# Patient Record
Sex: MALE | Race: WHITE | URBAN - METROPOLITAN AREA
[De-identification: names, ages, dates, MRNs, and addresses within clinical notes are randomized per-mention and may not be internally consistent; named-entity substitution may affect disease eponyms.]

---

## 2017-12-04 ENCOUNTER — OFFICE VISIT (OUTPATIENT)
Dept: OTOLARYNGOLOGY | Facility: CLINIC | Age: 52
End: 2017-12-04

## 2017-12-04 VITALS — HEIGHT: 71 IN | WEIGHT: 192 LBS | BODY MASS INDEX: 26.88 KG/M2

## 2017-12-04 DIAGNOSIS — R09.81 NASAL CONGESTION: Primary | ICD-10-CM

## 2017-12-04 RX ORDER — BUDESONIDE 0.5 MG/2ML
INHALANT ORAL
COMMUNITY
Start: 2017-06-28

## 2017-12-04 ASSESSMENT — PAIN SCALES - GENERAL: PAINLEVEL: NO PAIN (0)

## 2017-12-04 NOTE — MR AVS SNAPSHOT
After Visit Summary   2017    Rubens Alcantar    MRN: [id number]           Patient Information     Date Of Birth          1965        Visit Information        Provider Department      2017 5:30 PM Andria Pleitez MD [facility] Ear Nose and Throat        Today's Diagnoses     Nasal congestion    -  1      Care Instructions    Plan of care:  We will contact you about seeing Dr Bennett  Clinic contact information:  1. To schedule an appointment call [phone], option 1  2. To talk to the Triage RN call [phone], option 3  3. If you need to speak to Tali or get a message to your doctor on a Friday, call the triage RN  4. TaliRN: [phone]  5. Surgery scheduling:      Antonia Hidalgo: [phone]      Lori Keita: [phone]  6. Fax: [phone]  7. [phone]            Follow-ups after your visit        Your next 10 appointments already scheduled     2018  1:00 PM CST   (Arrive by 12:45 PM)   New Patient Visit with MD SHARDA Dooley [facility] Ear Nose and Throat ([facility] [place])    [address] [place] [id number] [phone]              Who to contact     Please call your clinic at [phone] to:    Ask questions about your health    Make or cancel appointments    Discuss your medicines    Learn about your test results    Speak to your doctor   If you have compliments or concerns about an experience at your clinic, or if you wish to file a complaint, please contact [facility] Patient Relations at [phone] or email us at [email or web address]         Additional Information About Your Visit        MyChart Information     [employer] Anyt[employer] | Anywherehart gives you secure access to your electronic health record. If you see a primary care provider, you can also send messages to your care team and make appointments. If you have questions, please call your primary care clinic.  If you do  "not have a primary care provider, please call [phone] and they will assist you.      ScanSocial is an electronic gateway that provides easy, online access to your medical records. With ScanSocial, you can request a clinic appointment, read your test results, renew a prescription or communicate with your care team.     To access your existing account, please contact your [facility] or call [phone] for assistance.        Care EveryWhere ID     This is your Care EveryWhere ID. This could be used by other organizations to access your [place] medical records  [id number]        Your Vitals Were     Height BMI (Body Mass Index)                1.791 m (5' 10.51\") 27.15 kg/m2           Blood Pressure from Last 3 Encounters:   No data found for BP    Weight from Last 3 Encounters:   12/04/17 87.1 kg (192 lb)   10/31/16 85.3 kg (188 lb)              Today, you had the following     No orders found for display       Primary Care Provider Office Phone # Fax #    Shane Ervin [phone]       [facility] [address] [facility] [address]        Equal Access to Services     [facility]: Hadii aad ku hadasho Soomaali, waaxda luqadaha, qaybta kaalmada adeegyada, alejandra solis . So [facility] [phone].    ATENCIÓN: Si habla español, tiene a ramirez disposición servicios gratuitos de asistencia lingüística. Llame al [phone].    We comply with applicable federal civil rights laws and [place] laws. We do not discriminate on the basis of race, color, national origin, age, disability, sex, sexual orientation, or gender identity.            Thank you!     Thank you for choosing [facility] EAR NOSE AND THROAT  for your care. Our goal is always to provide you with excellent care. Hearing back from our patients is one way we can continue to improve our services. Please take a few minutes to complete the written survey that you may receive in the " mail after your visit with us. Thank you!             Your Updated Medication List - Protect others around you: Learn how to safely use, store and throw away your medicines at www.disposemymeds.org.          This list is accurate as of: 12/4/17 11:59 PM.  Always use your most recent med list.                   Brand Name Dispense Instructions for use Diagnosis    acetaminophen 325 MG tablet    TYLENOL     Take 650 mg by mouth        budesonide 0.5 MG/2ML neb solution    PULMICORT     MIX 1 VIAL IN SALINE NASAL RINSE AND USE TO RINSE NOSE AS DIRECTED. USE 1 TO 2 TIMES A DAY        * EPINEPHrine 0.15 MG/0.3ML injection 2-pack    EPIPEN JR          * EPIPEN 2-KEVYN 0.3 MG/0.3ML injection 2-pack   Generic drug:  EPINEPHrine           zolpidem 10 MG tablet    AMBIEN          * Notice:  This list has 2 medication(s) that are the same as other medications prescribed for you. Read the directions carefully, and ask your doctor or other care provider to review them with you.

## 2017-12-04 NOTE — NURSING NOTE
Chief Complaint   Patient presents with     RECHECK     sinus issues again per pt wife     Charisse Pulido Medical Assistant

## 2017-12-04 NOTE — LETTER
12/4/2017       RE: Rubens Alcantar  [address] [place] [address]     Dear Colleague,    Thank you for referring your patient, Rubens Alcantar, to the [facility] EAR NOSE AND THROAT at [facility]. Please see a copy of my visit note below.    HISTORY OF PRESENT ILLNESS:  Rubens Godinez is a patient who I saw over a year ago with a history of sinus surgeries.  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  I recommend that he meet with a facial plastic surgeon for consideration of a functional rhinoplasty.  He went to [place] and was seen by Dr. Zhang.  He underwent rhinoplasty.  He has continued to have difficulty on the right side.  A revision rhinoplasty is planned for February with rib graft.  He is here to discuss whether or not this seems like a reasonable approach.  He is managing his sinus symptoms very well with topical nasal steroids.  He is using them every other day in budesonide irrigation form.  He has had a bit of a change with his sinus secretions recently and is going to cut back a little bit on the budesonide as he seems to be a bit too dry.    With regards to his breathing, he feels like he is constantly sniffling.  On the right side, he had static nasal valve narrowing and he does seem to sniffle and try to pull his lateral nasal wall away from his septum frequently using his facial muscles.        PHYSICAL EXAMINATION:  His exam shows a straight dorsum that is well supported.  His nasal septum is mostly in the midline except that the nasal valve area on the right where there is slight bowing toward the nasal valve itself.  He does have a static narrowing and a bit of nasal valve collapse on that side. Otherwise, his septum looks straight.  His nasal mucosa looks healthy.      ASSESSMENT AND PLAN:  I agree with the need for a revision procedure  to the extent that he may benefit from something slightly more simple such as a LATERA implant I am not sure.  I am going to have him see my colleague, Dr. Anne Bennett.  He is comfortable seeking a second opinion.  He will keep his surgery scheduled in February in the event that is with Dr. Anne Bennett feels is most appropriate.      HB/ms         Again, thank you for allowing me to participate in the care of your patient.      Sincerely,    Andria Pleitez MD

## 2017-12-05 NOTE — PATIENT INSTRUCTIONS
Plan of care:  We will contact you about seeing Dr BustillosBen  [facility] contact information:  1. To schedule an appointment call [phone], option 1  2. To talk to the Triage RN call [phone], option 3  3. If you need to speak to Tali or get a message to your doctor on a Friday, call the triage RN  4. TaliRN: [phone]  5. Surgery scheduling:      Antonia Hidalgo: [phone]      Lori Keita: [phone]  6. Fax: [phone]  7. [phone]

## 2017-12-05 NOTE — PROGRESS NOTES
HISTORY OF PRESENT ILLNESS:  Rubens Godinez is a patient who I saw over a year ago with a history of sinus surgeries.  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  I recommend that he meet with a facial plastic surgeon for consideration of a functional rhinoplasty.  He went to [facility] and was seen by Dr. Zhang.  He underwent rhinoplasty.  He has continued to have difficulty on the right side.  A revision rhinoplasty is planned for February with rib graft.  He is here to discuss whether or not this seems like a reasonable approach.  He is managing his sinus symptoms very well with topical nasal steroids.  He is using them every other day in budesonide irrigation form.  He has had a bit of a change with his sinus secretions recently and is going to cut back a little bit on the budesonide as he seems to be a bit too dry.    With regards to his breathing, he feels like he is constantly sniffling.  On the right side, he had static nasal valve narrowing and he does seem to sniffle and try to pull his lateral nasal wall away from his septum frequently using his facial muscles.        PHYSICAL EXAMINATION:  His exam shows a straight dorsum that is well supported.  His nasal septum is mostly in the midline except that the nasal valve area on the right where there is slight bowing toward the nasal valve itself.  He does have a static narrowing and a bit of nasal valve collapse on that side. Otherwise, his septum looks straight.  His nasal mucosa looks healthy.      ASSESSMENT AND PLAN:  I agree with the need for a revision procedure to the extent that he may benefit from something slightly more simple such as a LATERA implant I am not sure.  I am going to have him see my colleague, Dr. Anne Bennett.  He is comfortable seeking a second opinion.  He will keep his surgery scheduled in February in the event  that is with Dr. Anne Bennett feels is most appropriate.      HB/ms

## 2018-01-03 ENCOUNTER — OFFICE VISIT (OUTPATIENT)
Dept: OTOLARYNGOLOGY | Facility: CLINIC | Age: 53
End: 2018-01-03
Payer: COMMERCIAL

## 2018-01-03 VITALS — BODY MASS INDEX: 28.29 KG/M2 | HEIGHT: 69 IN | WEIGHT: 191 LBS

## 2018-01-03 DIAGNOSIS — J34.2 DEVIATED NASAL SEPTUM: ICD-10-CM

## 2018-01-03 DIAGNOSIS — J34.89 OBSTRUCTION OF NASAL VALVE: Primary | ICD-10-CM

## 2018-01-03 ASSESSMENT — PAIN SCALES - GENERAL: PAINLEVEL: MILD PAIN (3)

## 2018-01-03 NOTE — NURSING NOTE
Chief Complaint   Patient presents with     Consult     Referral from Doctor Pleitez 2 nd opinion septoplasty     Hans Marquez LPN

## 2018-01-03 NOTE — PROGRESS NOTES
Facial Plastic and Reconstructive Surgery Consultation    Referring Provider:   Andria Pleitez MD  [address]  [place] [address]    HPI:   I had the pleasure of seeing Rubens Alcantar today in clinic for consultation for  Nasal obstruction.   Rubens Alcantar is a 52 year old male with a history of open septorhinoplasty with right ear cartilage graft by Dr. Gordon Zhang at [facility] in March of 2017. He had residual nasal obstruction on the right for which Dr. Zhang has recommended a revision surgery with rib grafting in February 2018.    He was evaluated by Dr. Pleitez who wanted to see if a Latera implant would be of potential benefit. She noted right nasal wall collapse on exam.        Review Of Systems  ROS: 10 point ROS neg other than the symptoms noted above in the HPI.    There is no problem list on file for this patient.    Past Surgical History:   Procedure Laterality Date     HEAD & NECK SURGERY  1991, 1997, 2004    Eye surgery 1991. Diviated septum surgery 1997. Cysts remove     NASAL/SINUS POLYPECTOMY  2015, 2016    No polyps found in 2016.     TONSILLECTOMY  2007     Current Outpatient Prescriptions   Medication Sig Dispense Refill     budesonide (PULMICORT) 0.5 MG/2ML neb solution MIX 1 VIAL IN SALINE NASAL RINSE AND USE TO RINSE NOSE AS DIRECTED. USE 1 TO 2 TIMES A DAY       acetaminophen (TYLENOL) 325 MG tablet Take 650 mg by mouth       EPINEPHrine (EPIPEN 2-KEVYN) 0.3 MG/0.3ML injection 2-pack        zolpidem (AMBIEN) 10 MG tablet        Bee venom; Bees; Erythromycin; Seasonal allergies; and Red dye  Social History     Social History     Marital status:      Spouse name: N/A     Number of children: N/A     Years of education: N/A     Occupational History     Not on file.     Social History Main Topics     Smoking status: Former Smoker     Packs/day: 1.00     Years: 23.00     Types: Cigarettes     Start date: 5/1/1982     Quit date: 11/13/2006     Smokeless tobacco: Not on file       Comment: Already Quit     Alcohol use Yes     Drug use: No     Sexual activity: Yes     Partners: Female     Birth control/ protection: Condom     Other Topics Concern     Not on file     Social History Narrative     Family History   Problem Relation Age of Onset     CANCER Brother      DIABETES Brother      HEART DISEASE Maternal Grandfather      Hypertension Maternal Grandfather      Hypertension Paternal Grandfather      CEREBROVASCULAR DISEASE Paternal Grandfather      Hypertension Mother      Hypertension Father      Depression Father      Substance Abuse Father      Hypertension Sister      Hypertension Brother      Asthma Other      Maternal Uncle       PE:  Alert and Oriented, Answering Questions Appropriately  Right brow laceration status post repair, normocephalic, Face Symmetric  Skin: Luz 2  Facial Nerve Intact and facial movement symmetric  EOM's, PEERL  Nasal Exam: No external Deformity, septum with slight deviation to the right with nasal valve collapse, some improvement with mofied mickie maneuver, left side with intact nasal breathing.  No mucopurulence or polyps, no masses  Chin: Normal   Lips/Teeth/Toungue/Gums: Lips intact, Normal Dentition, Occlusion intact, Oral mucosa intact, no lesions/ulcerations/masses, Tongue mobile  OP: Palate elevates with speech, Mallampati 2, Uvula midline  Neck: No lymphadenopathy, no thyromegaly, trachea midline  Chest: No wheezing, cyanosis, or stridor  Card: Normal upper extremity pulses and capillary refill, not diaphoretic  Neuro/Psych: CN's 2-12 intact, Moves all extremities, ambulation in intact, positive affect, no notable muscle weakness          IMPRESSION:    Septal deviation with right nasal valve collapse      PLAN:    Rubens Alcantar presents today with notable anterior septal deviation and lateral nasal wall collapse leading to internal nasal valve stenosis.  He has had prior septorhinoplasty with ear grafting and is scheduled for revision  surgery with rib graft in February with Dr. Zhang.    Today I assessed him to see if with lateral nasal wall implants would be a good option for him.  Based on my examination he only had a mild improvement in nasal breathing with a modified [place] maneuver which demonstrates that the septum is a significant component of his obstruction.  Lateral nasal wall implants are optimally patients were the collapse of the nasal wall is the primary or even singular component.    I discussed this with the patient and his wife today.  I do believe that Dr. Zhang has an appropriate plan for him and I have encouraged him to proceed with the course of treatment.    He also had a history of a hockey injury with laceration through the right brow and lid he had this glued and sutured I remove some of the glue on the area today.  We discussed optimal scar treatment.          Photodocumentation was obtained.     I spent a total of 45 minutes face-to-face with Rubens Alcantar during today's office visit.  Over 50% of this time was spent counseling the patient and/or coordinating care regarding his nasal obstruction and treatment.  See note for details.

## 2018-01-03 NOTE — MR AVS SNAPSHOT
"              After Visit Summary   1/3/2018    Rubens Alcantar    MRN: [id number]           Patient Information     Date Of Birth          1965        Visit Information        Provider Department      1/3/2018 1:00 PM Mitzy Bennett MD [facility] Ear Nose and Throat        Today's Diagnoses     Obstruction of nasal valve    -  1    Deviated nasal septum           Follow-ups after your visit        Who to contact     Please call your clinic at [phone] to:    Ask questions about your health    Make or cancel appointments    Discuss your medicines    Learn about your test results    Speak to your doctor   If you have compliments or concerns about an experience at your clinic, or if you wish to file a complaint, please contact [facility] Patient Relations at [phone] or email us at [email or web address]         Additional Information About Your Visit        MyChart Information     [employer] gives you secure access to your electronic health record. If you see a primary care provider, you can also send messages to your care team and make appointments. If you have questions, please call your primary care clinic.  If you do not have a primary care provider, please call [phone] and they will assist you.      Private Practice is an electronic gateway that provides easy, online access to your medical records. With Private Practice, you can request a clinic appointment, read your test results, renew a prescription or communicate with your care team.     To access your existing account, please contact your [facility] or call [phone] for assistance.        Care EveryWhere ID     This is your Care EveryWhere ID. This could be used by other organizations to access your [place] medical records  [id number]        Your Vitals Were     Height BMI (Body Mass Index)                1.75 m (5' 8.9\") 28.29 kg/m2           Blood Pressure from Last 3 Encounters: "   No data found for BP    Weight from Last 3 Encounters:   01/03/18 86.6 kg (191 lb)   12/04/17 87.1 kg (192 lb)   10/31/16 85.3 kg (188 lb)              Today, you had the following     No orders found for display       Primary Care Provider Office Phone # Fax #    Shane Ervin [phone]       [place] [facility] [address]  [facility] [address]        Equal Access to Services     YULY NORMAN : Hadii aad ku hadasho Soomaali, waaxda luqadaha, qaybta kaalmada adeegyada, waxay idiin hayaan adeeg kharajosef la'roselyn . So [facility] [phone].    ATENCIÓN: Si habla español, tiene a ramirez disposición servicios gratuitos de asistencia lingüística. [facility] [phone].    We comply with applicable federal civil rights laws and [place] laws. We do not discriminate on the basis of race, color, national origin, age, disability, sex, sexual orientation, or gender identity.            Thank you!     Thank you for choosing [facility] EAR NOSE AND THROAT  for your care. Our goal is always to provide you with excellent care. Hearing back from our patients is one way we can continue to improve our services. Please take a few minutes to complete the written survey that you may receive in the mail after your visit with us. Thank you!             Your Updated Medication List - Protect others around you: Learn how to safely use, store and throw away your medicines at www.disposemymeds.org.          This list is accurate as of: 1/3/18  5:28 PM.  Always use your most recent med list.                   Brand Name Dispense Instructions for use Diagnosis    acetaminophen 325 MG tablet    TYLENOL     Take 650 mg by mouth        budesonide 0.5 MG/2ML neb solution    PULMICORT     MIX 1 VIAL IN SALINE NASAL RINSE AND USE TO RINSE NOSE AS DIRECTED. USE 1 TO 2 TIMES A DAY        EPIPEN 2-KEVYN 0.3 MG/0.3ML injection 2-pack   Generic drug:  EPINEPHrine           zolpidem 10 MG tablet    AMBIEN

## 2018-01-03 NOTE — LETTER
1/3/2018       RE: Rubens Alcantar  [address] [place] [address]     Dear Colleague,    Thank you for referring your patient, Rubens Alcantar, to the [facility] EAR NOSE AND THROAT at [facility]. Please see a copy of my visit note below.    Facial Plastic and Reconstructive Surgery Consultation    Referring Provider:   Andria Pleitez MD  [address] [facility] [address]  [place] [address]    HPI:   I had the pleasure of seeing Rubens Alcantar today in clinic for consultation for  Nasal obstruction.   Rubens Alcantar is a 52 year old male with a history of open septorhinoplasty with right ear cartilage graft by Dr. Gordon Zhang at [place] in March of 2017. He had residual nasal obstruction on the right for which Dr. Zhang has recommended a revision surgery with rib grafting in February 2018.    He was evaluated by Dr. Pleitez who wanted to see if a Latera implant would be of potential benefit. She noted right nasal wall collapse on exam.        Review Of Systems  ROS: 10 point ROS neg other than the symptoms noted above in the HPI.    There is no problem list on file for this patient.    Past Surgical History:   Procedure Laterality Date     HEAD & NECK SURGERY  1991, 1997, 2004    Eye surgery 1991. Diviated septum surgery 1997. Cysts remove     NASAL/SINUS POLYPECTOMY  2015, 2016    No polyps found in 2016.     TONSILLECTOMY  2007     Current Outpatient Prescriptions   Medication Sig Dispense Refill     budesonide (PULMICORT) 0.5 MG/2ML neb solution MIX 1 VIAL IN SALINE NASAL RINSE AND USE TO RINSE NOSE AS DIRECTED. USE 1 TO 2 TIMES A DAY       acetaminophen (TYLENOL) 325 MG tablet Take 650 mg by mouth       EPINEPHrine (EPIPEN 2-KEVYN) 0.3 MG/0.3ML injection 2-pack        zolpidem (AMBIEN) 10 MG tablet        Bee venom; Bees; Erythromycin; Seasonal allergies; and Red dye  Social History     Social History     Marital status:      Spouse name: N/A     Number of children: N/A      Years of education: N/A     Occupational History     Not on file.     Social History Main Topics     Smoking status: Former Smoker     Packs/day: 1.00     Years: 23.00     Types: Cigarettes     Start date: 5/1/1982     Quit date: 11/13/2006     Smokeless tobacco: Not on file      Comment: Already Quit     Alcohol use Yes     Drug use: No     Sexual activity: Yes     Partners: Female     Birth control/ protection: Condom     Other Topics Concern     Not on file     Social History Narrative     Family History   Problem Relation Age of Onset     CANCER Brother      DIABETES Brother      HEART DISEASE Maternal Grandfather      Hypertension Maternal Grandfather      Hypertension Paternal Grandfather      CEREBROVASCULAR DISEASE Paternal Grandfather      Hypertension Mother      Hypertension Father      Depression Father      Substance Abuse Father      Hypertension Sister      Hypertension Brother      Asthma Other      Maternal Uncle       PE:  Alert and Oriented, Answering Questions Appropriately  Right brow laceration status post repair, normocephalic, Face Symmetric  Skin: Luz 2  Facial Nerve Intact and facial movement symmetric  EOM's, PEERL  Nasal Exam: No external Deformity, septum with slight deviation to the right with nasal valve collapse, some improvement with mofied mickie maneuver, left side with intact nasal breathing.  No mucopurulence or polyps, no masses  Chin: Normal   Lips/Teeth/Toungue/Gums: Lips intact, Normal Dentition, Occlusion intact, Oral mucosa intact, no lesions/ulcerations/masses, Tongue mobile  OP: Palate elevates with speech, Mallampati 2, Uvula midline  Neck: No lymphadenopathy, no thyromegaly, trachea midline  Chest: No wheezing, cyanosis, or stridor  Card: Normal upper extremity pulses and capillary refill, not diaphoretic  Neuro/Psych: CN's 2-12 intact, Moves all extremities, ambulation in intact, positive affect, no notable muscle weakness          IMPRESSION:    Septal  deviation with right nasal valve collapse      PLAN:    Rubens Alcantar presents today with notable anterior septal deviation and lateral nasal wall collapse leading to internal nasal valve stenosis.  He has had prior septorhinoplasty with ear grafting and is scheduled for revision surgery with rib graft in February with Dr. Zhang.    Today I assessed him to see if with lateral nasal wall implants would be a good option for him.  Based on my examination he only had a mild improvement in nasal breathing with a modified Catawba maneuver which demonstrates that the septum is a significant component of his obstruction.  Lateral nasal wall implants are optimally patients were the collapse of the nasal wall is the primary or even singular component.    I discussed this with the patient and his wife today.  I do believe that Dr. Zhang has an appropriate plan for him and I have encouraged him to proceed with the course of treatment.    He also had a history of a hockey injury with laceration through the right brow and lid he had this glued and sutured I remove some of the glue on the area today.  We discussed optimal scar treatment.          Photodocumentation was obtained.     I spent a total of 45 minutes face-to-face with Rubens Alcantar during today's office visit.  Over 50% of this time was spent counseling the patient and/or coordinating care regarding his nasal obstruction and treatment.  See note for details.        Again, thank you for allowing me to participate in the care of your patient.      Sincerely,    Mitzy Bennett MD

## 2018-02-13 ENCOUNTER — TRANSFERRED RECORDS (OUTPATIENT)
Dept: HEALTH INFORMATION MANAGEMENT | Facility: CLINIC | Age: 53
End: 2018-02-13

## 2018-02-14 ENCOUNTER — TRANSFERRED RECORDS (OUTPATIENT)
Dept: HEALTH INFORMATION MANAGEMENT | Facility: CLINIC | Age: 53
End: 2018-02-14

## 2018-02-15 ENCOUNTER — TRANSFERRED RECORDS (OUTPATIENT)
Dept: HEALTH INFORMATION MANAGEMENT | Facility: CLINIC | Age: 53
End: 2018-02-15

## 2018-05-03 ENCOUNTER — CARE COORDINATION (OUTPATIENT)
Dept: OTOLARYNGOLOGY | Facility: CLINIC | Age: 53
End: 2018-05-03

## 2018-05-03 NOTE — PROGRESS NOTES
Spouse left message on care coordinators voicemail asking for call back regarding:   Patient had seen both Dr Pleitez and Dr Bennett for nasal obstruction, then had revision surgery with rib graft by Dr Zhang at [facility] in Feb 2018, which is not helping, he is actually worse. Wondering if he should come back and see Dr Pleitez and/or Dr Bennett.  Message left on spouse's voicemail: patient should follow up with Dr Zhang. If he does want to eventually follow with us, he should wait a good six months.

## 2018-05-14 NOTE — PROGRESS NOTES
[facility] Call Center    Phone Message    May a detailed message be left on voicemail: yes    Reason for Call: Other: Pt's wife Lisbet calling in, requesting for pt to see both Maik and Sabrina. She states she had gotten in touch with Tali earlier this month, who recommended seeing Dr. Zhang at the [facility]. Per Lisbet, they did see Vicente again and were told there was nothing further he could do. Per Lisbet, Dr. Zhang stated he should have been feeling better by this time, and to f/u with the surgeons. Lisbet states pt is continuing to have issues with breathing regularly, and is worried the surgery for the nasal blockage did not work. Pt is currently scheduled to see Maik/Sabrina, per Lisbet's request, on 6/20; they are overlapping appointments. Lisbet requests to speak to a nurse about symptoms and a possible sooner appt.    Action Taken: Message routed to:  [facility] ([facility]): [facility]

## 2018-05-21 ENCOUNTER — CARE COORDINATION (OUTPATIENT)
Dept: OTOLARYNGOLOGY | Facility: CLINIC | Age: 53
End: 2018-05-21

## 2018-05-21 NOTE — PROGRESS NOTES
Follow up from spouse's call:  Message left on voicemail: As discussed in previous message, both Dr Pleitez and Dr Bennett have suggested patient follow up at [facility], and that patient wait 6 months to be seen, but since patient has decided to schedule an appt earlier regardless of that recommendation, we will see him at scheduled appt.    Patient did follow up with [facility] (? When) and stated that Dr Zhang felt patient should follow up with ENT[facility].

## 2018-06-12 ENCOUNTER — TELEPHONE (OUTPATIENT)
Dept: OTOLARYNGOLOGY | Facility: CLINIC | Age: 53
End: 2018-06-12

## 2018-06-13 ENCOUNTER — TELEPHONE (OUTPATIENT)
Dept: OTOLARYNGOLOGY | Facility: CLINIC | Age: 53
End: 2018-06-13

## 2018-06-13 NOTE — TELEPHONE ENCOUNTER
[facility] Call Center    Phone Message    May a detailed message be left on voicemail: yes    Reason for Call: Other: Per pt's spouse, returning missed call regarding appt with Dr. Bennett - canceled due to provider having an emergency. Per Lisbet, those appts were set up back to back, was wondering if there is anything that can be accommodated? First available with Dr. Bennett is in August.    Action Taken: Message routed to:  [facility] ([facility]): ENT

## 2018-06-14 NOTE — TELEPHONE ENCOUNTER
Patient upset about the cancellation with Dr. Tressa Contreras on 6-20-18.  Upon reviewing the notes, patient had surgery with an outside provider at [facility] in February 2018.      Patient needs to wait a MINIMUM of 6 months before Dr. Tressa Contrreas would see this patient.  August would be the earliest time and no sooner.  Patient may call back to schedule an appt with Dr. Bennett.  We will not work him into her schedule any sooner.      Patient is to follow up with his surgeon that performed his surgery for his nasal issues.    I left a vmcl for the patient with above stated information.    Lamar Acosta RN  6/14/2018 9:02 AM

## 2018-07-18 ENCOUNTER — OFFICE VISIT (OUTPATIENT)
Dept: OTOLARYNGOLOGY | Facility: CLINIC | Age: 53
End: 2018-07-18
Payer: COMMERCIAL

## 2018-07-18 VITALS — WEIGHT: 190 LBS | HEIGHT: 70 IN | BODY MASS INDEX: 27.2 KG/M2

## 2018-07-18 DIAGNOSIS — J34.89 OBSTRUCTION OF NASAL VALVE: Primary | ICD-10-CM

## 2018-07-18 RX ORDER — AMLODIPINE BESYLATE 2.5 MG/1
2.5 TABLET ORAL DAILY
Refills: 1 | COMMUNITY
Start: 2018-06-10

## 2018-07-18 ASSESSMENT — PAIN SCALES - GENERAL: PAINLEVEL: MILD PAIN (2)

## 2018-07-18 NOTE — LETTER
7/18/2018       RE: Rubens Alcatnar  [address] [place] [address]     Dear Colleague,    Thank you for referring your patient, Rubens Alcantar, to the [facility] EAR NOSE AND THROAT at [facility]. Please see a copy of my visit note below.    Rubens has had difficulty with nasal breathing and sinus issues for many years.  He has undergone 5 procedures for this.  His last 2 procedures were rhinoplasty at [facility] followed by revision with rib graft in February of this year.  He continues to have R > L obstruction.  He is here to discuss options.  He has tried nasal steroid with no benefit.  Some pain on left anterior maxilla.  No obvious sinus issues.      Patient Supplied Answers to Review of Systems   ENT ROS 7/18/2018   Constitutional Problems with sleep   Neurology Dizzy spells   Psychology -   Eyes -   Ears, Nose, Throat Ear pain, Ringing/noise in ears   Cardiopulmonary -   Gastrointestinal/Genitourinary Heartburn/indigestion   Musculoskeletal Sore or stiff joints   Allergy/Immunology -     Exam:  R nasal valve narrow, left nicely open.  No abnormal secretions.      A/P:  Rubens and his wife feel that [facility] has nothing more to offer.  I reviewed his symptoms and exam, and do not feel there is a sinus component - offered CT for evaluation but they feel comfortable holding off on this.  They plan to meet with Dr. Tressa Contreras in the next month or so to discuss whether there are any recommended surgical options.    Again, thank you for allowing me to participate in the care of your patient.      Sincerely,    Andria Pleitez MD

## 2018-07-18 NOTE — MR AVS SNAPSHOT
"              After Visit Summary   7/18/2018    Rubens Alcantar    MRN: [id number]           Patient Information     Date Of Birth          1965        Visit Information        Provider Department      7/18/2018 3:45 PM Andria Pleitez MD [facility] Ear Nose and Throat        Today's Diagnoses     Obstruction of nasal valve    -  1       Follow-ups after your visit        Your next 10 appointments already scheduled     Aug 08, 2018  4:40 PM CDT   (Arrive by 4:25 PM)   Return Visit with MD SHARDA Dooley [facility] Ear Nose and Throat [facility])    [address] [place] [id number] [phone]              Who to contact     Please call your clinic at [phone] to:    Ask questions about your health    Make or cancel appointments    Discuss your medicines    Learn about your test results    Speak to your doctor            Additional Information About Your Visit        MyChart Information     [employer] gives you secure access to your electronic health record. If you see a primary care provider, you can also send messages to your care team and make appointments. If you have questions, please call your primary care clinic.  If you do not have a primary care provider, please call [phone] and they will assist you.      [employer] is an electronic gateway that provides easy, online access to your medical records. With [employer], you can request a clinic appointment, read your test results, renew a prescription or communicate with your care team.     To access your existing account, please contact your [facility] or call [phone] for assistance.        Care EveryWhere ID     This is your Care EveryWhere ID. This could be used by other organizations to access your [facility] medical records  [id number]        Your Vitals Were     Height BMI (Body Mass Index)                1.77 m (5' 9.69\") 27.51 kg/m2           Blood Pressure " from Last 3 Encounters:   No data found for BP    Weight from Last 3 Encounters:   07/18/18 86.2 kg (190 lb)   01/03/18 86.6 kg (191 lb)   12/04/17 87.1 kg (192 lb)              Today, you had the following     No orders found for display       Primary Care Provider Office Phone # Fax #    Shane Ervin [phone]       [place] [facility] [address]  [facility] [address]        Equal Access to Services     DESIREE NORMAN : Hadii aad ku hadasho Soomaali, waaxda luqadaha, qaybta kaalmada adeegyada, waxay idiin hayaan adeeg ligia laasmita . So [facility] [phone].    ATENCIÓN: Si habla español, tiene a ramirez disposición servicios gratuitos de asistencia lingüística. [facility] [phone].    We comply with applicable federal civil rights laws and [place] laws. We do not discriminate on the basis of race, color, national origin, age, disability, sex, sexual orientation, or gender identity.            Thank you!     Thank you for choosing [facility] EAR NOSE AND THROAT  for your care. Our goal is always to provide you with excellent care. Hearing back from our patients is one way we can continue to improve our services. Please take a few minutes to complete the written survey that you may receive in the mail after your visit with us. Thank you!             Your Updated Medication List - Protect others around you: Learn how to safely use, store and throw away your medicines at www.disposemymeds.org.          This list is accurate as of 7/18/18 11:59 PM.  Always use your most recent med list.                   Brand Name Dispense Instructions for use Diagnosis    acetaminophen 325 MG tablet    TYLENOL     Take 650 mg by mouth        amLODIPine 2.5 MG tablet    NORVASC     Take 2.5 mg by mouth daily        budesonide 0.5 MG/2ML neb solution    PULMICORT     MIX 1 VIAL IN SALINE NASAL RINSE AND USE TO RINSE NOSE AS DIRECTED. USE 1 TO 2 TIMES A DAY        EPIPEN 2-KEVYN 0.3 MG/0.3ML injection 2-pack   Generic  drug:  EPINEPHrine           zolpidem 10 MG tablet    AMBIEN

## 2018-07-18 NOTE — NURSING NOTE
"Chief Complaint   Patient presents with     RECHECK     nasal obstruction      Height 1.77 m (5' 9.69\"), weight 86.2 kg (190 lb).    .  Hans Marquez LPN    "

## 2018-07-19 NOTE — PROGRESS NOTES
Rubens has had difficulty with nasal breathing and sinus issues for many years.  He has undergone 5 procedures for this.  His last 2 procedures were rhinoplasty at [facility] followed by revision with rib graft in February of this year.  He continues to have R > L obstruction.  He is here to discuss options.  He has tried nasal steroid with no benefit.  Some pain on left anterior maxilla.  No obvious sinus issues.      Patient Supplied Answers to Review of Systems   ENT ROS 7/18/2018   Constitutional Problems with sleep   Neurology Dizzy spells   Psychology -   Eyes -   Ears, Nose, Throat Ear pain, Ringing/noise in ears   Cardiopulmonary -   Gastrointestinal/Genitourinary Heartburn/indigestion   Musculoskeletal Sore or stiff joints   Allergy/Immunology -     Exam:  R nasal valve narrow, left nicely open.  No abnormal secretions.      A/P:  Rubens and his wife feel that [facility] has nothing more to offer.  I reviewed his symptoms and exam, and do not feel there is a sinus component - offered CT for evaluation but they feel comfortable holding off on this.  They plan to meet with Dr. Tressa Contreras in the next month or so to discuss whether there are any recommended surgical options.

## 2018-08-08 ENCOUNTER — OFFICE VISIT (OUTPATIENT)
Dept: OTOLARYNGOLOGY | Facility: CLINIC | Age: 53
End: 2018-08-08
Payer: COMMERCIAL

## 2018-08-08 DIAGNOSIS — R09.81 NASAL CONGESTION: Primary | ICD-10-CM

## 2018-08-08 ASSESSMENT — PAIN SCALES - GENERAL: PAINLEVEL: MILD PAIN (3)

## 2018-08-08 NOTE — NURSING NOTE
Chief Complaint   Patient presents with     RECHECK     nasal obstruction     There were no vitals taken for this visit.    Herve Olmos

## 2018-08-08 NOTE — MR AVS SNAPSHOT
After Visit Summary   8/8/2018    Rubens Alcantar    MRN: [id number]           Patient Information     Date Of Birth          1965        Visit Information        Provider Department      8/8/2018 4:40 PM Mitzy Bennett MD [facility] Ear Nose and Throat        Care Instructions    Plan of Care:  1.  Will coordinate CT of Sinus locally for you in [facility]  2.  Referral to our facial pain clinic    Clinic Information:  1. To schedule an appointment please call [phone], option 1  2. To talk to a Triage RN please call [phone] select option 3  3. To speak to Dr. Bennett's nurse, Lamar, please call [phone]    Lamar Acosta RN  8/8/2018 5:29 PM            Follow-ups after your visit        Who to contact     Please call your clinic at [phone] to:    Ask questions about your health    Make or cancel appointments    Discuss your medicines    Learn about your test results    Speak to your doctor            Additional Information About Your Visit        [employer] Information     [employer] gives you secure access to your electronic health record. If you see a primary care provider, you can also send messages to your care team and make appointments. If you have questions, please call your primary care clinic.  If you do not have a primary care provider, please call [phone] and they will assist you.      [employer] is an electronic gateway that provides easy, online access to your medical records. With [employer], you can request a clinic appointment, read your test results, renew a prescription or communicate with your care team.     To access your existing account, please contact your [facility] or call [phone] for assistance.        Care EveryWhere ID     This is your Care EveryWhere ID. This could be used by other organizations to access your [facility] medical records  [id number]         Blood Pressure from Last 3 Encounters:   No  data found for BP    Weight from Last 3 Encounters:   07/18/18 86.2 kg (190 lb)   01/03/18 86.6 kg (191 lb)   12/04/17 87.1 kg (192 lb)              Today, you had the following     No orders found for display       Primary Care Provider Office Phone # Fax #    Shane Ervin [phone]       [place] [facility] [address]  [facility] [address]        Equal Access to Services     DESIREE NORMAN : Hadii aad ku hadasho Soomaali, waaxda luqadaha, qaybta kaalmada adeegyada, waxay idiin hayaan adeeg janaearajosef la'roselyn . So [facility] [phone].    ATENCIÓN: Si habla español, tiene a ramirez disposición servicios gratuitos de asistencia lingüística. [facility] [phone].    We comply with applicable federal civil rights laws and [place] laws. We do not discriminate on the basis of race, color, national origin, age, disability, sex, sexual orientation, or gender identity.            Thank you!     Thank you for choosing [facility] EAR NOSE AND THROAT  for your care. Our goal is always to provide you with excellent care. Hearing back from our patients is one way we can continue to improve our services. Please take a few minutes to complete the written survey that you may receive in the mail after your visit with us. Thank you!             Your Updated Medication List - Protect others around you: Learn how to safely use, store and throw away your medicines at www.disposemymeds.org.          This list is accurate as of 8/8/18  5:30 PM.  Always use your most recent med list.                   Brand Name Dispense Instructions for use Diagnosis    acetaminophen 325 MG tablet    TYLENOL     Take 650 mg by mouth        amLODIPine 2.5 MG tablet    NORVASC     Take 2.5 mg by mouth daily        budesonide 0.5 MG/2ML neb solution    PULMICORT     MIX 1 VIAL IN SALINE NASAL RINSE AND USE TO RINSE NOSE AS DIRECTED. USE 1 TO 2 TIMES A DAY        EPIPEN 2-KEVYN 0.3 MG/0.3ML injection 2-pack   Generic drug:  EPINEPHrine            zolpidem 10 MG tablet    AMBIEN

## 2018-08-08 NOTE — LETTER
"8/8/2018       RE: Rubens Alcantar  [address] [place] [address]     Dear Colleague,    Thank you for referring your patient, Rubens Alcantar, to the [facility] at [facility]. Please see a copy of my visit note below.    Facial Plastic and Reconstructive Surgery Consultation    Referring Provider:   Referred Self, MD  No address on file    HPI:   I had the pleasure of seeing Rubens Alcantar today in clinic for consultation for nasal obstruction, nasal pain and pressure. I first met him in January of 2018 to assess whether lateral nasal wall implants would be appropriate for his symptoms. He had had a rhinoplasty with Dr. Zhang at [facility] [facility] and a month later had a revision surgery planned to address persistent right nasal obstruction.     He underwent the surgery which included an autologous rib graft and was done through an endonasal approach. he states that he noted worsening of symptoms immediately post op. Assessment by Dr. Zhang did not note additional pathology that could be addressed.      A 7/2018 consultation with Andria Pleitez shows that she did not feel like there was a sinus component and thus held off on CT scan.    Mr. Alcantar continues to describe right nasal pressure, cheek pressure and obstruction. He states that this radiates to his eye and upper brow. He points to right infraorbital foramen region of cheek when he describes the pressure and the pain .    He is quite distressed by this and states \"If this is the way it is going to be, I can't live with it\". He was quite open about the frustration and how this has been a significant burden for him.     Review Of Systems  ROS: 10 point ROS neg other than the symptoms noted above in the HPI.    Patient Active Problem List   Diagnosis     Asthma     Past Surgical History:   Procedure Laterality Date     HEAD & NECK SURGERY  1991, 1997, 2004    Eye surgery 1991. Diviated septum surgery 1997. Cysts " remove     NASAL/SINUS POLYPECTOMY  2015, 2016    No polyps found in 2016.     TONSILLECTOMY  2007     Current Outpatient Prescriptions   Medication Sig Dispense Refill     acetaminophen (TYLENOL) 325 MG tablet Take 650 mg by mouth       amLODIPine (NORVASC) 2.5 MG tablet Take 2.5 mg by mouth daily  1     budesonide (PULMICORT) 0.5 MG/2ML neb solution MIX 1 VIAL IN SALINE NASAL RINSE AND USE TO RINSE NOSE AS DIRECTED. USE 1 TO 2 TIMES A DAY       EPINEPHrine (EPIPEN 2-KEVYN) 0.3 MG/0.3ML injection 2-pack        zolpidem (AMBIEN) 10 MG tablet        Bee venom; Bees; Erythromycin; Seasonal allergies; and Red dye  Social History     Social History     Marital status:      Spouse name: N/A     Number of children: N/A     Years of education: N/A     Occupational History     Not on file.     Social History Main Topics     Smoking status: Former Smoker     Packs/day: 1.00     Years: 23.00     Types: Cigarettes     Start date: 5/1/1982     Quit date: 11/13/2006     Smokeless tobacco: Not on file      Comment: Already Quit     Alcohol use Yes     Drug use: No     Sexual activity: Yes     Partners: Female     Birth control/ protection: Condom     Other Topics Concern     Not on file     Social History Narrative     Family History   Problem Relation Age of Onset     Cancer Brother      Diabetes Brother      HEART DISEASE Maternal Grandfather      Hypertension Maternal Grandfather      Hypertension Paternal Grandfather      Cerebrovascular Disease Paternal Grandfather      Hypertension Mother      Hypertension Father      Depression Father      Substance Abuse Father      Hypertension Sister      Hypertension Brother      Asthma Other      Maternal Uncle       PE:  Alert and Oriented, Answering Questions Appropriately  Atraumatic, Normocephalic, Face Symmetric  Skin: Luz 3  Facial Nerve Intact and facial movement symmetric  EOM's, PEERL  Nasal Exam: No external Deformity, incision from prior rhinoplasty surgery.  Right nasal valve patent, although more narrow than the left. Neither the modified mickie maneuver with a Qtip, not the speculum widening of the nostril lead to significant improvement in his nasal breathing and symptoms. no mucopurulence or polyps, no masses, evaluated closely where he feels the discomfort intranasally and I do not see scar, lesion or any pathology  Chin: Normal   Lips/Teeth/Toungue/Gums: Lips intact, Normal Dentition, Occlusion intact, Oral mucosa intact, no lesions/ulcerations/masses, Tongue mobile  OP: Palate elevates with speech, Mallampati 2, Uvula midline  Neck: No lymphadenopathy, no thyromegaly, trachea midline  Chest: No wheezing, cyanosis, or stridor  Card: Normal upper extremity pulses and capillary refill, not diaphoretic  Neuro/Psych: CN's 2-12 intact, Moves all extremities, ambulation in intact, positive affect, no notable muscle weakness            IMPRESSION:    Nasal congestion  Nasal pain        PLAN:    Rubens Alcantar presents today with a spectrum of symptoms that I cannot attribute to a specific nasal structural deficit. True he does has a mildly narrowed right internal nasal valve, however, when I fully support this area and hold it widely open he does not have significantly improvement.     His radiating pressure and discomfort at localized to the infrarorbital nerve distribution and so I wonder if this is a spectrum of symptoms associated with some trigeminal dysfunction. I do think that a comprehensive multidisciplinary consideration of his symptoms by the pain clinic would be valuable.      I agree with Dr. Pleitez that he does not on exam appear to have sinus infection, but I do think it may be helpful at this point to obtain a CT scan to rule this out.      In addition I discussed with Rubens and his wife that I am concerned about the stress that all of this has caused for him and I recommended that he consider speaking to a counselor. This may helping him cope as we  continue to work to find the best way to help his symptoms.    Orders Placed This Encounter   Procedures     CT Sinus with & without Contrast     Referral to Facial Pain Clinic  Offered referral for counseling   Offered lidocaine V2 block to assess whether this is source of the discomfort      Photodocumentation was obtained.     I spent a total of 40 minutes face-to-face with Rubens Alcantar during today's office visit.  Over 50% of this time was spent counseling the patient and/or coordinating care regarding his nasal obstruction and facial discomfort.  See note for details.        Again, thank you for allowing me to participate in the care of your patient.      Sincerely,    Mitzy Bennett MD

## 2018-08-08 NOTE — PATIENT INSTRUCTIONS
Plan of Care:  1.  Will coordinate CT of Sinus locally for you in [place] or [place]  2.  Referral to our facial pain clinic    Clinic Information:  1. To schedule an appointment please call [phone], option 1  2. To talk to a Triage RN please call [phone] select option 3  3. To speak to Dr. Bennett's nurse, Lamar, please call [phone]    Lamar Acosta RN  8/8/2018 5:29 PM

## 2018-08-09 NOTE — NURSING NOTE
Ordered CT of Sinus - will fax this to [facility] per patient request.  Referral made to our facial pain clinic.  Recommendation for patient to establish care with a psych provider.    Patient to follow up with Dr. Bennett as needed.      No surgical options are being offered at this time as patient is less than 6 months out from his nasal reconstructive surgery with Dr. Zhang.    Lamar Acosta RN  8/9/2018 11:45 AM

## 2018-08-13 DIAGNOSIS — R51.9 FACIAL PAIN: Primary | ICD-10-CM

## 2018-08-30 ENCOUNTER — TELEPHONE (OUTPATIENT)
Dept: OTOLARYNGOLOGY | Facility: CLINIC | Age: 53
End: 2018-08-30

## 2018-08-30 NOTE — TELEPHONE ENCOUNTER
Called [facility] and made appt for CT of Sinus on 9-17-18.  Left vcml for patient letting him know and he can reschedule that if he likes.      Faxed the order to [facility] department and also to Dr. Shane Ervin (PCP).    Referral to Facial Pain Clinic done.    Lamar Acosta RN  8/30/2018 10:29 AM

## 2018-09-01 NOTE — PROGRESS NOTES
"Facial Plastic and Reconstructive Surgery Consultation    Referring Provider:   Referred Self, MD  No address on file    HPI:   I had the pleasure of seeing Rubens Alcantar today in clinic for consultation for nasal obstruction, nasal pain and pressure. I first met him in January of 2018 to assess whether lateral nasal wall implants would be appropriate for his symptoms. He had had a rhinoplasty with Dr. Zhang at [place] and a month later had a revision surgery planned to address persistent right nasal obstruction.     He underwent the surgery which included an autologous rib graft and was done through an endonasal approach. he states that he noted worsening of symptoms immediately post op. Assessment by Dr. Zhang did not note additional pathology that could be addressed.      A 7/2018 consultation with Andria Pleitez shows that she did not feel like there was a sinus component and thus held off on CT scan.    Mr. Alcantar continues to describe right nasal pressure, cheek pressure and obstruction. He states that this radiates to his eye and upper brow. He points to right infraorbital foramen region of cheek when he describes the pressure and the pain .    He is quite distressed by this and states \"If this is the way it is going to be, I can't live with it\". He was quite open about the frustration and how this has been a significant burden for him.     Review Of Systems  ROS: 10 point ROS neg other than the symptoms noted above in the HPI.    Patient Active Problem List   Diagnosis     Asthma     Past Surgical History:   Procedure Laterality Date     HEAD & NECK SURGERY  1991, 1997, 2004    Eye surgery 1991. Diviated septum surgery 1997. Cysts remove     NASAL/SINUS POLYPECTOMY  2015, 2016    No polyps found in 2016.     TONSILLECTOMY  2007     Current Outpatient Prescriptions   Medication Sig Dispense Refill     acetaminophen (TYLENOL) 325 MG tablet Take 650 mg by mouth       amLODIPine (NORVASC) 2.5 MG tablet Take 2.5 " mg by mouth daily  1     budesonide (PULMICORT) 0.5 MG/2ML neb solution MIX 1 VIAL IN SALINE NASAL RINSE AND USE TO RINSE NOSE AS DIRECTED. USE 1 TO 2 TIMES A DAY       EPINEPHrine (EPIPEN 2-KEVYN) 0.3 MG/0.3ML injection 2-pack        zolpidem (AMBIEN) 10 MG tablet        Bee venom; Bees; Erythromycin; Seasonal allergies; and Red dye  Social History     Social History     Marital status:      Spouse name: N/A     Number of children: N/A     Years of education: N/A     Occupational History     Not on file.     Social History Main Topics     Smoking status: Former Smoker     Packs/day: 1.00     Years: 23.00     Types: Cigarettes     Start date: 5/1/1982     Quit date: 11/13/2006     Smokeless tobacco: Not on file      Comment: Already Quit     Alcohol use Yes     Drug use: No     Sexual activity: Yes     Partners: Female     Birth control/ protection: Condom     Other Topics Concern     Not on file     Social History Narrative     Family History   Problem Relation Age of Onset     Cancer Brother      Diabetes Brother      HEART DISEASE Maternal Grandfather      Hypertension Maternal Grandfather      Hypertension Paternal Grandfather      Cerebrovascular Disease Paternal Grandfather      Hypertension Mother      Hypertension Father      Depression Father      Substance Abuse Father      Hypertension Sister      Hypertension Brother      Asthma Other      Maternal Uncle       PE:  Alert and Oriented, Answering Questions Appropriately  Atraumatic, Normocephalic, Face Symmetric  Skin: Luz 3  Facial Nerve Intact and facial movement symmetric  EOM's, PEERL  Nasal Exam: No external Deformity, incision from prior rhinoplasty surgery. Right nasal valve patent, although more narrow than the left. Neither the modified mickie maneuver with a Qtip, not the speculum widening of the nostril lead to significant improvement in his nasal breathing and symptoms. no mucopurulence or polyps, no masses, evaluated closely where  he feels the discomfort intranasally and I do not see scar, lesion or any pathology  Chin: Normal   Lips/Teeth/Toungue/Gums: Lips intact, Normal Dentition, Occlusion intact, Oral mucosa intact, no lesions/ulcerations/masses, Tongue mobile  OP: Palate elevates with speech, Mallampati 2, Uvula midline  Neck: No lymphadenopathy, no thyromegaly, trachea midline  Chest: No wheezing, cyanosis, or stridor  Card: Normal upper extremity pulses and capillary refill, not diaphoretic  Neuro/Psych: CN's 2-12 intact, Moves all extremities, ambulation in intact, positive affect, no notable muscle weakness            IMPRESSION:    Nasal congestion  Nasal pain        PLAN:    Rubens Alcantar presents today with a spectrum of symptoms that I cannot attribute to a specific nasal structural deficit. True he does has a mildly narrowed right internal nasal valve, however, when I fully support this area and hold it widely open he does not have significantly improvement.     His radiating pressure and discomfort at localized to the infrarorbital nerve distribution and so I wonder if this is a spectrum of symptoms associated with some trigeminal dysfunction. I do think that a comprehensive multidisciplinary consideration of his symptoms by the pain clinic would be valuable.      I agree with Dr. Pleitez that he does not on exam appear to have sinus infection, but I do think it may be helpful at this point to obtain a CT scan to rule this out.      In addition I discussed with Rubens and his wife that I am concerned about the stress that all of this has caused for him and I recommended that he consider speaking to a counselor. This may helping him cope as we continue to work to find the best way to help his symptoms.    Orders Placed This Encounter   Procedures     CT Sinus with & without Contrast     Referral to Facial Pain Clinic  Offered referral for counseling   Offered lidocaine V2 block to assess whether this is source of the  discomfort      Photodocumentation was obtained.     I spent a total of 40 minutes face-to-face with Rubens Alcantar during today's office visit.  Over 50% of this time was spent counseling the patient and/or coordinating care regarding his nasal obstruction and facial discomfort.  See note for details.

## 2018-11-07 ENCOUNTER — TELEPHONE (OUTPATIENT)
Dept: OTOLARYNGOLOGY | Facility: CLINIC | Age: 53
End: 2018-11-07

## 2018-11-07 NOTE — TELEPHONE ENCOUNTER
Phone Call:     Contact Name [facility] CT   Outcome 9/18/18 CT Sinus images will be pushed to [employer] PACS shortly

## 2018-11-16 ENCOUNTER — TELEPHONE (OUTPATIENT)
Dept: OTOLARYNGOLOGY | Facility: CLINIC | Age: 53
End: 2018-11-16

## 2019-10-01 ENCOUNTER — HEALTH MAINTENANCE LETTER (OUTPATIENT)
Age: 54
End: 2019-10-01

## 2021-01-15 ENCOUNTER — HEALTH MAINTENANCE LETTER (OUTPATIENT)
Age: 56
End: 2021-01-15

## 2021-10-24 ENCOUNTER — HEALTH MAINTENANCE LETTER (OUTPATIENT)
Age: 56
End: 2021-10-24

## 2022-02-13 ENCOUNTER — HEALTH MAINTENANCE LETTER (OUTPATIENT)
Age: 57
End: 2022-02-13

## 2022-10-16 ENCOUNTER — HEALTH MAINTENANCE LETTER (OUTPATIENT)
Age: 57
End: 2022-10-16

## 2023-03-26 ENCOUNTER — HEALTH MAINTENANCE LETTER (OUTPATIENT)
Age: 58
End: 2023-03-26

## 2025-08-16 ENCOUNTER — HEALTH MAINTENANCE LETTER (OUTPATIENT)
Age: 60
End: 2025-08-16

## 2025-08-22 ENCOUNTER — ANCILLARY PROCEDURE (OUTPATIENT)
Dept: CARDIOLOGY | Facility: CLINIC | Age: 60
End: 2025-08-22
Attending: FAMILY MEDICINE
Payer: COMMERCIAL

## 2025-08-22 DIAGNOSIS — Q23.81 BICUSPID AORTIC VALVE: ICD-10-CM

## 2025-08-22 LAB — LVEF ECHO: NORMAL

## 2025-08-22 PROCEDURE — 93306 TTE W/DOPPLER COMPLETE: CPT | Performed by: INTERNAL MEDICINE
